# Patient Record
Sex: MALE | Race: WHITE | URBAN - METROPOLITAN AREA
[De-identification: names, ages, dates, MRNs, and addresses within clinical notes are randomized per-mention and may not be internally consistent; named-entity substitution may affect disease eponyms.]

---

## 2017-02-25 ENCOUNTER — OFFICE VISIT (OUTPATIENT)
Dept: URGENT CARE | Facility: URGENT CARE | Age: 52
End: 2017-02-25
Payer: COMMERCIAL

## 2017-02-25 VITALS
WEIGHT: 225 LBS | SYSTOLIC BLOOD PRESSURE: 116 MMHG | TEMPERATURE: 98.7 F | HEART RATE: 75 BPM | RESPIRATION RATE: 15 BRPM | OXYGEN SATURATION: 97 % | DIASTOLIC BLOOD PRESSURE: 79 MMHG

## 2017-02-25 DIAGNOSIS — H57.8A9 SENSATION OF FOREIGN BODY IN EYE: Primary | ICD-10-CM

## 2017-02-25 PROCEDURE — 99213 OFFICE O/P EST LOW 20 MIN: CPT | Performed by: FAMILY MEDICINE

## 2017-02-25 RX ORDER — POLYMYXIN B SULFATE AND TRIMETHOPRIM 1; 10000 MG/ML; [USP'U]/ML
1 SOLUTION OPHTHALMIC 4 TIMES DAILY
Qty: 1 BOTTLE | Refills: 0 | Status: SHIPPED | OUTPATIENT
Start: 2017-02-25 | End: 2017-03-04

## 2017-02-25 ASSESSMENT — PAIN SCALES - GENERAL: PAINLEVEL: MILD PAIN (2)

## 2017-02-25 NOTE — MR AVS SNAPSHOT
"              After Visit Summary   2017    Michelet Rod    MRN: [id number]           Patient Information     Date Of Birth          1965        Visit Information        Provider Department      2017 4:25 PM Darby Stack MD [facility]        Today's Diagnoses     Sensation of foreign body in eye    -  1       Follow-ups after your visit        Who to contact     If you have questions or need follow up information about today's clinic visit or your schedule please contact [facility] directly at [phone].  Normal or non-critical lab and imaging results will be communicated to you by [employer], letter or phone within 4 business days after the clinic has received the results. If you do not hear from us within 7 days, please contact the clinic through [employer] or phone. If you have a critical or abnormal lab result, we will notify you by phone as soon as possible.  Submit refill requests through [employer] or call your pharmacy and they will forward the refill request to us. Please allow 3 business days for your refill to be completed.          Additional Information About Your Visit        MyChart Information     [employer] lets you send messages to your doctor, view your test results, renew your prescriptions, schedule appointments and more. To sign up, go to [email or web address] . Click on \"Log in\" on the left side of the screen, which will take you to the Welcome page. Then click on \"Sign up Now\" on the right side of the page.     You will be asked to enter the access code listed below, as well as some personal information. Please follow the directions to create your username and password.     Your access code is: [email or web address]  Expires: 2017  9:26 PM     Your access code will  in 90 days. If you need help or a new code, please call your [facility] or [phone].        Care EveryWhere ID     This is your Care EveryWhere ID. This could be used " by other organizations to access your [facility] medical records  [id number]        Your Vitals Were     Pulse Temperature Respirations Pulse Oximetry          75 98.7  F (37.1  C) (Oral) 15 97%         Blood Pressure from Last 3 Encounters:   02/25/17 116/79    Weight from Last 3 Encounters:   02/25/17 225 lb (102.1 kg)              Today, you had the following     No orders found for display         Today's Medication Changes          These changes are accurate as of: 2/25/17  9:26 PM.  If you have any questions, ask your nurse or doctor.               Start taking these medicines.        Dose/Directions    trimethoprim-polymyxin b ophthalmic solution   Commonly known as:  POLYTRIM   Used for:  Sensation of foreign body in eye   Started by:  Darby Stack MD        Dose:  1 drop   Place 1 drop Into the left eye 4 times daily for 7 days   Quantity:  1 Bottle   Refills:  0            Where to get your medicines      These medications were sent to [facility]/pharmacy #[id number] - [place], [place] - [address] [place] [address] [address] OF [address], [place] [address]     Phone:  [phone]     trimethoprim-polymyxin b ophthalmic solution                Primary Care Provider Office Phone # Fax #    Fili Smith [phone]       [facility] [place] [id number]  [place] [address]        Thank you!     [facility] you for [facility]. Our goal is always to provide you with excellent care. Hearing back from our patients is one way we can continue to improve our services. Please take a few minutes to complete the written survey that you may receive in the mail after your visit with us. Thank you!             Your Updated Medication List - Protect others around you: Learn how to safely use, store and throw away your medicines at www.disposemymeds.org.          This list is accurate as of: 2/25/17  9:26 PM.  Always use your most  recent med list.                   Brand Name Dispense Instructions for use    trimethoprim-polymyxin b ophthalmic solution    POLYTRIM    1 Bottle    Place 1 drop Into the left eye 4 times daily for 7 days

## 2017-02-25 NOTE — NURSING NOTE
Chief Complaint   Patient presents with     Eye Problem     something in eye       Initial /79  Pulse 75  Temp 98.7  F (37.1  C) (Oral)  Resp 15  Wt 225 lb (102.1 kg)  SpO2 97% There is no height or weight on file to calculate BMI.  Medication Reconciliation: complete   Margarita Polanco MA

## 2017-02-25 NOTE — PROGRESS NOTES
SUBJECTIVE:                                                    Michelet Rod is a 51 year old male who presents to clinic today for the following health issues:      Eye(s) Problem      Duration: 1 hour    Description:  Location: left  Pain: no   Redness: no   Discharge: no     Accompanying signs and symptoms: none    History (Trauma, foreign body exposure,): yes possible piece of metal    Precipitating or alleviating factors (contact use): None    Therapies tried and outcome: None     Was underneath a vehicle looking upward when a piece of possible went straight into the left eye  Patient rubbed it thought he might have rubbed it  However thinks he might have something still there  When he blinks feels it scraping possibly with the upper eyelid  No blurring of vision no lightsensitivity no pain with eye movement  Accompanied by wife  denies wearing contact lenses  denies eye pain  denies blurring of vision  denies URI symptoms  Tried supportive treatment no relief  Worsening symptoms hence came in    Problem list, Medication list, Allergies, and Medical/Social/Surgical histories reviewed in EPIC and updated as appropriate.    ROS:  General: negative for fever  EYE: as above  No fevers or chills chest pain or shortness of breath     OBJECTIVE:  /79  Pulse 75  Temp 98.7  F (37.1  C) (Oral)  Resp 15  Wt 225 lb (102.1 kg)  SpO2 97%   General : Awake Alert not in any acute cardiorespiratory distress  Head:       Normocephalic Atraumatic  Eyes:    Pupils equally reactive to light and accomodation. Sclera not icteric. Extra occular muscles intact full and equal. No hyphema, no hypopyon, no ciliary flush. No eyelid swelling or periorbital cellulitis. Mild erythema of  left  conjunctiva. Everted eyelids no foreign body seen  Examined under magnifying   Also no increase uptake on fluoroscein dye of the left eye  Neurologic: No cranial nerve deficits.   Psych: Appropriate mood and affect. Pleasant  Skin: patient  undressed to level of his/her comfort. No visible concerning lesions.      ASSESSMENT:    ICD-10-CM    1. Foreign body of left eye, initial encounter T15.92XA trimethoprim-polymyxin b (POLYTRIM) ophthalmic solution           PLAN:   Exam was done to the best of my ability but limited without the use of slit lamp  Patient states that discomfort is very mild and he just wanted to make sure  No foreign body or abrasion seen but will empirically treat with polytrim and recommend recheck on Monday with eye clinic (today is Saturday pm)  If worsening symptoms or concerns tomorrow patient will go to ER.  Advised about symptoms which might herald more serious problems.    adverse reactions of medication discussed  advised to come back in right away if with any worsening symptoms or if with no relief   aware to come in right away especially if with any blurring of vision, photophobia, pain, feeling of foreign body.   despite treatment plan  patient voiced understanding and had no further questions at this time.        Darby Stack MD